# Patient Record
Sex: FEMALE | Race: BLACK OR AFRICAN AMERICAN | Employment: OTHER | URBAN - METROPOLITAN AREA
[De-identification: names, ages, dates, MRNs, and addresses within clinical notes are randomized per-mention and may not be internally consistent; named-entity substitution may affect disease eponyms.]

---

## 2017-05-15 ENCOUNTER — OFFICE VISIT (OUTPATIENT)
Dept: VASCULAR SURGERY | Age: 77
End: 2017-05-15

## 2017-05-15 DIAGNOSIS — R60.0 BILATERAL LEG EDEMA: ICD-10-CM

## 2017-05-15 DIAGNOSIS — I73.9 PAD (PERIPHERAL ARTERY DISEASE) (HCC): ICD-10-CM

## 2017-05-15 NOTE — PROCEDURES
[facility]   *** FINAL REPORT ***    Name: Garfield Boeck  MRN: [id number]       Outpatient  : 1940  HIS Order #: [id number]  [id number] [facility] Visit #: [id number]  Date: 15 May 2017    TYPE OF TEST: Peripheral Arterial Testing    REASON FOR TEST  Peripheral vascular dz NOS    Right Leg  Segmentals: Normal                     mmHg  Brachial         119  High thigh  Low thigh  Calf  Posterior tibial 130  Dorsalis pedis   118  Peroneal  Metatarsal  Toe pressure      51  Doppler:    Normal  Ankle/Brachial: 1.08    Left Leg  Segmentals: Abnormal                     mmHg  Brachial         120  High thigh  Low thigh  Calf  Posterior tibial  66  Dorsalis pedis    91  Peroneal  Metatarsal  Toe pressure      40  Doppler:    Abnormal  Ankle/Brachial: 0.76    INTERPRETATION/FINDINGS  Physiologic testing was performed using continuous wave doppler and  segmental pressures. 1. No evidence of significant peripheral arterial disease at rest in  the right leg. 2. Moderate peripheral arterial disease indicated at rest in the left  leg. 3. The right ankle/brachial index is 1.08 and the left ankle/brachial  index is 0.76.  4. The DBI on the right is 0.43 and on the left is 0.33. Essentially no significant changes as compared with previous study. ADDITIONAL COMMENTS    I have personally reviewed the data relevant to the interpretation of  this  study. TECHNOLOGIST: Adan Aguilar RVT, BS  Signed: 05/15/2017 11:27 AM    PHYSICIAN: Zina Alcocer MD  Signed: 05/15/2017 01:35 PM

## 2017-05-15 NOTE — PROCEDURES
[facility]   *** FINAL REPORT ***    Name: Kimberlee Matute  MRN: [id number]       Outpatient  : 1940  HIS Order #: [id number]  [id number] [facility] Visit #: [id number]  Date: 15 May 2017    TYPE OF TEST: Extremity Arterial Duplex    REASON FOR TEST  Peripheral vascular dz NOS                            Right                     Left  Artery               PSV   Finding             PSV   Finding  ------------------  -----  ---------------    -----  ---------------  External iliac:                                92.0  Common femoral:                               144.0  Profunda femoris:                             103.0  Proximal SFA:                            105.0  Mid SFA:                                 126.0  Distal SFA:                               81.0  Popliteal:                                     43.0  Anterior tibial:                               28.0  Posterior tibial:                              71.0    Pressures               Right     Left               -----     -----     Brachial:   119       120           DP:   118        91           PT:   130        66            ERWIN:  1.08      0.76            Toe:    51        40      INTERPRETATION/FINDINGS  Duplex images were obtained using 2-D gray scale, color flow and  spectral doppler analysis. Left leg :  1. Patent distal left external iliac artery, left common femoral  artery, proximal left profunda femoris artery and left fermoal artery  without significant stenosis. Triphasic to bipasic signals noted. 2. Patent left popliteal artery with elevated velocities noted  suggesting moderate stenosis. Biphasic to poorly biphasic signals  noted. 3. Left posterior tibial artery patent with poorly biphasic to  monophasic signals noted. Left peroneal artery and left anterior  tibial artery patent in the segments accessible to scan. Poorly  biphasic signals noted. 4. Diffuse calcific plaquing noted in the arteries assessed.   5. ABIs suggest perfusion within normal limits on the right at rest  and moderate arterial insufficiency on the left at rest.  The right  ankle/brachial index is 1.08 and the left ankle/brachial index is  0.76. ADDITIONAL COMMENTS    I have personally reviewed the data relevant to the interpretation of  this  study. TECHNOLOGIST: Emerson Aguilar RVT, BS  Signed: 05/15/2017 12:42 PM    PHYSICIAN: Jose Tao MD  Signed: 05/15/2017 01:36 PM

## 2017-05-30 ENCOUNTER — OFFICE VISIT (OUTPATIENT)
Dept: VASCULAR SURGERY | Age: 77
End: 2017-05-30

## 2017-05-30 VITALS
WEIGHT: 189 LBS | HEIGHT: 66 IN | BODY MASS INDEX: 30.37 KG/M2 | HEART RATE: 75 BPM | RESPIRATION RATE: 14 BRPM | DIASTOLIC BLOOD PRESSURE: 74 MMHG | SYSTOLIC BLOOD PRESSURE: 112 MMHG

## 2017-05-30 DIAGNOSIS — I73.9 PAD (PERIPHERAL ARTERY DISEASE) (HCC): Primary | ICD-10-CM

## 2017-05-30 DIAGNOSIS — R60.0 BILATERAL LEG EDEMA: ICD-10-CM

## 2017-05-30 NOTE — PROGRESS NOTES
Lilly Alegre    Chief Complaint   Patient presents with    Leg Pain       History and Physical    24-year-old female following up today regarding lower extremities. She has a history of mild to moderate atherosclerotic disease also swelling and some varicosities. She has had no new complaints to report she has had some local hospital visits for dehydration. She is to manage this ileostomy. She is stepped into a cactus and is occasionally having prickly parts surface but the brush away. She has no chronic ulceration. She is treated for plantar fasciitis going to physical therapy which is improving. Past Medical History:   Diagnosis Date    Diabetes ([address]     Fatigue     Rectal bleeding 9/13    TIA (transient ischemic attack)      Patient Active Problem List   Diagnosis Code    Varicose veins of lower extremities with other complications D12.212    PAD (peripheral artery disease) ([facility]) I73.9    Bilateral leg edema R60.0     Past Surgical History:   Procedure Laterality Date    HX COLECTOMY  9/2013    with ileostomy    HX COLONOSCOPY  9/2013    HX HYSTERECTOMY  1980    HX OTHER SURGICAL      left big toe ingrown nail removed, toe next to big toe on left foot is numb without circulation     Current Outpatient Prescriptions   Medication Sig Dispense Refill    cholecalciferol (VITAMIN D3) 1,000 unit cap Take  by mouth daily.  metFORMIN (GLUCOPHAGE) 500 mg tablet Take  by mouth two (2) times daily (with meals).  cholestyramine-sucrose (QUESTRAN) 4 gram powder Take  by mouth three (3) times daily (with meals).  Bifidobacterium Infantis (ALIGN) 4 mg cap Take 1 Cap by Mouth Once a Day.  mometasone (NASONEX) 50 mcg/actuation nasal spray 1 [place] by Both Nostrils route daily.  ferrous sulfate 325 mg (65 mg iron) tablet Take 1 Cap by Mouth.  aspirin delayed-release 81 mg tablet Take 1 Tab by Mouth Once a Day.       loperamide (IMODIUM A-D) 2 mg tablet Take 2 mg by Mouth Every Morning Before Breakfast.      sitaGLIPtin (JANUVIA) 100 mg tablet Take 1 Tab by Mouth Once a Day.  psyllium (METAMUCIL) powd Take  by Mouth Take As Needed. Allergies   Allergen Reactions    Apple Juice Other (comments)    Banana Unknown (comments)    Gemfibrozil Unknown (comments)    Oxycodone-Acetaminophen Other (comments)     Causes hallucinations    Shellfish Derived Unknown (comments)     denies    Statins-Hmg-Coa Reductase Inhibitors Unknown (comments)       Review of Systems    A full review of systems was completed times ten organ systems and was deemed negative unless otherwise mentioned in the HPI. Physical   Visit Vitals    /74 (BP 1 Location: Right arm, BP Patient Position: Sitting)    Pulse 75    Resp 14    Ht 5' 6\" (1.676 m)    Wt 189 lb (85.7 kg)    BMI 30.51 kg/m2       Healthy appearing 77 thin statured no distress  Head is normocephalic pupils are reactive  Neck no JVD  Chest clear  Cardiac regular  Abdomen soft nondistended ileostomy notable  Lower extremity she does have some swelling at the ankle and foot left side more than right without ulceration or skin thinning  Scattered varicosities notable  Handheld Doppler poorly biphasic pulses  Reviewed her vascular studies she has some mild to moderate SFA popliteal transition thickening and tibial disease seems to have poorly biphasic signal      Impression/Plan:     ICD-10-CM ICD-9-CM    1.  PAD (peripheral artery disease) ([facility]) I73.9 443.9 LOWER EXT ART PVR MULT LEVEL SEG PRESSURES AMB   2. Bilateral leg edema R60.0 782.3 LOWER EXT ART PVR MULT LEVEL SEG PRESSURES AMB    moderate atherosclerotic disease of the lower extremity arteries without symptoms  No claudication or ulceration  Active follow-up in 6 months for recheck  Orders Placed This Encounter    LOWER EXT ART PVR MULT LEVEL SEG PRESSURES AMB       Follow-up Disposition: Not on 2020 Judson Wilder MD    PLEASE NOTE:  This document has been produced using voice recognition software. Unrecognized errors in transcription may be present.

## 2017-11-30 ENCOUNTER — OFFICE VISIT (OUTPATIENT)
Dept: VASCULAR SURGERY | Age: 77
End: 2017-11-30

## 2017-11-30 DIAGNOSIS — R60.0 BILATERAL LEG EDEMA: ICD-10-CM

## 2017-11-30 DIAGNOSIS — I73.9 PAD (PERIPHERAL ARTERY DISEASE) (HCC): ICD-10-CM

## 2017-11-30 NOTE — PROCEDURES
[facility]   *** FINAL REPORT ***    Name: Saskia Sandoval  MRN: [id number]       Outpatient  : 1940  HIS Order #: [id number]  [id number] [facility] Visit #: [id number]  Date: 2017    TYPE OF TEST: Peripheral Arterial Testing    REASON FOR TEST  Peripheral vascular dz NOS    Right Leg  Segmentals: Normal                     mmHg  Brachial         118  High thigh  Low thigh  Calf             118  Posterior tibial 139  Dorsalis pedis   130  Peroneal  Metatarsal  Toe pressure      61  Doppler:    Normal  Ankle/Brachial: 1.18    Left Leg  Segmentals: Abnormal                     mmHg  Brachial         110  High thigh  Low thigh  Calf             123  Posterior tibial  88  Dorsalis pedis   106  Peroneal  Metatarsal  Toe pressure      43  Doppler:    Normal  Ankle/Brachial: 0.90    Site of occlusive disease:-  tibioperoneal segment    INTERPRETATION/FINDINGS  Physiologic testing was performed using continuous wave doppler and  segmental pressures. 1. No evidence of significant peripheral arterial disease at rest in  the right leg. 2. Mild to moderate peripheral arterial insufficiency  at rest in the  left leg with infrapopliteal disease. 3. The right ankle/brachial index is 1.18 and the left ankle/brachial  index is 0.90.  4. The right digit/brachial index is 0.52 and the left digit/brachial  index is 0.36.  5. Compared to the previous study on 5-15-17 there is no significant  change. ADDITIONAL COMMENTS    I have personally reviewed the data relevant to the interpretation of  this  study. TECHNOLOGIST: Neli Sibley RVT, RDMS  Signed: 2017 10:15 AM    PHYSICIAN: Blue Prabhakar.  Florian Menard MD  Signed: 2017 08:43 AM

## 2017-12-12 ENCOUNTER — OFFICE VISIT (OUTPATIENT)
Dept: VASCULAR SURGERY | Age: 77
End: 2017-12-12

## 2017-12-12 VITALS
SYSTOLIC BLOOD PRESSURE: 118 MMHG | RESPIRATION RATE: 8 BRPM | WEIGHT: 189 LBS | DIASTOLIC BLOOD PRESSURE: 70 MMHG | HEIGHT: 66 IN | BODY MASS INDEX: 30.37 KG/M2

## 2017-12-12 DIAGNOSIS — I73.9 PAD (PERIPHERAL ARTERY DISEASE) (HCC): Primary | ICD-10-CM

## 2017-12-12 NOTE — MR AVS SNAPSHOT
Visit Information Date & Time Provider Department Dept. Phone Encounter #  
 12/12/2017 10:00 AM MD [facility] [id number] Upcoming Health Maintenance Date Due DTaP/Tdap/Td series (1 - Tdap) 2/18/1961 ZOSTER VACCINE AGE 60> 12/18/1999 GLAUCOMA SCREENING Q2Y 2/18/2005 OSTEOPOROSIS SCREENING (DEXA) 2/18/2005 Pneumococcal 65+ Low/Medium Risk (1 of 2 - PCV13) 2/18/2005 MEDICARE YEARLY EXAM 2/18/2005 Influenza Age 5 to Adult 8/1/2017 Allergies as of 12/12/2017  Review Complete On: 12/12/2017 By: Joaquin Dodge MD  
  
 Severity Noted Reaction Type Reactions Apple Juice  10/30/2014    Other (comments) Banana    Unknown (comments) Gemfibrozil  06/11/2014    Unknown (comments) Oxycodone-acetaminophen  10/27/2015    Other (comments) Causes hallucinations Shellfish Derived    Unknown (comments) denies Statins-hmg-coa Reductase Inhibitors    Unknown (comments) Current Immunizations  Never Reviewed No immunizations on file. Not reviewed this visit You Were Diagnosed With   
  
 Codes Comments PAD (peripheral artery disease) (HCC)    -  Primary ICD-10-CM: I73.9 ICD-9-CM: 443. 9 Vitals BP Resp Height(growth percentile) Weight(growth percentile) BMI OB Status 118/70 (BP 1 Location: Left arm, BP Patient Position: Sitting) 8 5' 6\" (1.676 m) 189 lb (85.7 kg) 30.51 kg/m2 Hysterectomy Smoking Status Never Smoker Vitals History BMI and BSA Data Body Mass Index Body Surface Area 30.51 kg/m 2 2 m 2 Preferred Pharmacy Pharmacy Name Phone [facility] [address] [place] [address] [phone] Your Updated Medication List  
  
   
This list is accurate as of: 12/12/17 10:39 AM.  Always use your most recent med list.  
  
  
  
  
 ALIGN 4 mg Cap Generic drug:  Bifidobacterium Infantis Take 1 Cap by Mouth Once a Day. aspirin delayed-release 81 mg tablet Take 1 Tab by Mouth Once a Day. ferrous sulfate 325 mg (65 mg iron) tablet  
two (2) times a day. Take 1 Cap by Mouth. IMODIUM A-D 2 mg tablet Generic drug:  loperamide Take 2 mg by Mouth Every  Morning Before Breakfast.  
  
 metFORMIN 500 mg tablet Commonly known as:  GLUCOPHAGE Take  by mouth two (2) times daily (with meals). NASONEX 50 mcg/actuation nasal spray Generic drug:  mometasone 1 [place] by Both Nostrils route daily. QUESTRAN 4 gram powder Generic drug:  cholestyramine-sucrose Take  by mouth three (3) times daily (with meals). SITagliptin 100 mg tablet Commonly known as:  Ariana Da Silvasen Take 1 Tab by Mouth Once a Day. VITAMIN D3 1,000 unit Cap Generic drug:  cholecalciferol Take  by mouth two (2) times a day. Please provide this summary of care documentation to your next provider. Your primary care clinician is listed as Mckenna Araujo. If you have any questions after today's visit, please call [phone].

## 2018-12-21 ENCOUNTER — OFFICE VISIT (OUTPATIENT)
Dept: VASCULAR SURGERY | Age: 78
End: 2018-12-21

## 2018-12-21 VITALS
DIASTOLIC BLOOD PRESSURE: 86 MMHG | HEIGHT: 66 IN | WEIGHT: 189 LBS | RESPIRATION RATE: 17 BRPM | SYSTOLIC BLOOD PRESSURE: 130 MMHG | HEART RATE: 80 BPM | BODY MASS INDEX: 30.37 KG/M2

## 2018-12-21 DIAGNOSIS — I73.9 PAD (PERIPHERAL ARTERY DISEASE) (HCC): Primary | ICD-10-CM

## 2018-12-21 NOTE — PROGRESS NOTES
Lyssa Mays    Chief Complaint   Patient presents with    Varicose Veins       History and Physical    70-year-old here for evaluation and vascular exam.  She has a history of tibial and small vessel PAD. She is treated for diabetes. She has peripheral neuropathy related to her diabetes. She has several episodes of neuropathy pain but it resolved she treats herself with a home tea and herbal medication that gives her relief she tells me she is very happy with this. She has occasional neuropathic ulcers on the bottom of the left foot and tip of the right first toe. She is diligent with her wound care and sees Dr. Argentina Crouch for offloading. Past Medical History:   Diagnosis Date    Diabetes ([address]     Fatigue     Rectal bleeding 9/13    TIA (transient ischemic attack)      Patient Active Problem List   Diagnosis Code    Varicose veins of lower extremities with other complications T00.207    PAD (peripheral artery disease) ([facility]) I73.9    Bilateral leg edema R60.0     Past Surgical History:   Procedure Laterality Date    HX COLECTOMY  9/2013    with ileostomy    HX COLONOSCOPY  9/2013    HX HYSTERECTOMY  1980    HX OTHER SURGICAL      left big toe ingrown nail removed, toe next to big toe on left foot is numb without circulation     Current Outpatient Medications   Medication Sig Dispense Refill    cholecalciferol (VITAMIN D3) 1,000 unit cap Take  by mouth two (2) times a day.  metFORMIN (GLUCOPHAGE) 500 mg tablet Take  by mouth two (2) times daily (with meals).  cholestyramine-sucrose (QUESTRAN) 4 gram powder Take  by mouth three (3) times daily (with meals).  Bifidobacterium Infantis (ALIGN) 4 mg cap Take 1 Cap by Mouth Once a Day.  mometasone (NASONEX) 50 mcg/actuation nasal spray 1 [place] by Both Nostrils route daily.  ferrous sulfate 325 mg (65 mg iron) tablet two (2) times a day. Take 1 Cap by Mouth.       aspirin delayed-release 81 mg tablet Take 1 Tab by Mouth Once a Day.  loperamide (IMODIUM A-D) 2 mg tablet Take 2 mg by Mouth Every  Morning Before Breakfast.      sitaGLIPtin (JANUVIA) 100 mg tablet Take 1 Tab by Mouth Once a Day. Allergies   Allergen Reactions    Apple Juice Other (comments)    Banana Unknown (comments)    Gemfibrozil Unknown (comments)    Oxycodone-Acetaminophen Other (comments)     Causes hallucinations    Shellfish Derived Unknown (comments)     denies    Statins-Hmg-Coa Reductase Inhibitors Unknown (comments)       Review of Systems    A full review of systems was completed times ten organ systems and was deemed negative unless otherwise mentioned in the HPI. Physical   Visit Vitals  /86 (BP 1 Location: Left arm, BP Patient Position: Sitting)   Pulse 80   Resp 17   Ht 5' 6\" (1.676 m)   Wt 189 lb (85.7 kg)   BMI 30.51 kg/m²       Youthful appearing 78 in good spirits  Head is normocephalic no facial symmetry  Neck no JVD I do not hear a carotid bruit  Chest clear  Cardiac regular  Abdomen soft flat nontender  Extremities range of motion strength are equal there is no deficit  Mild bilateral ankle and foot edema  Doppler pulses present bilateral  Left heel callus noted does not appear open  Right big toe examined no signs of infection  No studies for this visit      Impression/Plan:     ICD-10-CM ICD-9-CM    1. PAD (peripheral artery disease) ([facility]) I73.9 443.9 LOWER EXT ART PVR MULT LEVEL SEG PRESSURES     Stable peripheral artery disease will reexamine in 1 year  Continue with her offloading and general wellness  We will check a PVL in 1 year  No orders of the defined types were placed in this encounter. Follow-up Disposition:  Return in about 1 year (around 12/21/2019). Farzana Em MD    PLEASE NOTE:  This document has been produced using voice recognition software. Unrecognized errors in transcription may be present.

## 2018-12-21 NOTE — PROGRESS NOTES
1. Have you been to an emergency room or urgent care clinic since your last visit? 2 weeks ago  Hospitalized since your last visit? If yes, where, when, and reason for visit? No  2. Have you seen or consulted any other health care providers outside of the [facility] since your last visit including any procedures, health maintenance items.  If yes, where, when and reason for visit? nO

## 2019-02-22 ENCOUNTER — OFFICE VISIT (OUTPATIENT)
Dept: VASCULAR SURGERY | Age: 79
End: 2019-02-22

## 2019-02-22 VITALS
WEIGHT: 189 LBS | RESPIRATION RATE: 17 BRPM | HEIGHT: 66 IN | BODY MASS INDEX: 30.37 KG/M2 | SYSTOLIC BLOOD PRESSURE: 120 MMHG | DIASTOLIC BLOOD PRESSURE: 64 MMHG | HEART RATE: 70 BPM

## 2019-02-22 DIAGNOSIS — S97.82XA CRUSHING INJURY OF LEFT FOOT, INITIAL ENCOUNTER: Primary | ICD-10-CM

## 2019-02-22 DIAGNOSIS — T14.8XXA HEMATOMA AND CONTUSION: ICD-10-CM

## 2019-02-22 DIAGNOSIS — I73.9 PAD (PERIPHERAL ARTERY DISEASE) (HCC): ICD-10-CM

## 2019-02-22 NOTE — PROGRESS NOTES
Ms Baylee Leija is here for an unscheduled visit  She is referred  By podiatry    She has known tibial disease of left leg, with intervention in 2014  She was seen in December by Dr Svetlana Alegre, and with no problems was recommended for a one year follow up    However, about 3 weeks ago, she dropped items on her left foot - twice in one day  She then had significant swelling and discoloration of the foot, as well as pain  She was also retaining fluid, and it sounds like she was given short course of some diuretics which helped  Podiatry did r/o fracture    But with amount of pain, and known PAD, studies and follow up were suggested    Overall, she does state some of the foot swelling and bruising are improved. But there is still some edema and discoloration on the top of the left foot which is consistent with a healing hematoma. There is no open wound. No erythema or warmth. I advised continue with efforts of some elevation, and warm compresses. We also applied and instructed use of light compression sleeve with a tubi     I have it noted to call her in about 2 weeks to inquire how she is doing so we can advise further follow up    Of note, I did review her studies. Her last studies had been in 2017 and she had a 0.90 loni. Recent studies this week show loni of 0.70. Still with tibial disease which has historically been level of disease treated before. She asked if her injury worsened these studies, and I stated that is an unlikely correlation. The measurements are obtained from vessels above the level of the injury. But knowing her perfusion, if this doesn't have continued improvement in pain, we can associate that the injury exacerbated some ischemic symptoms, and we may have to entertain discussion of an intervention. But we both agreed, neither of us are quick to jump on a decision for a procedure!  So I reinforced use of the conservative measures above, and I will call her in a couple of weeks to check on her progress    Total time spent with patient today was 15 minutes

## 2019-02-22 NOTE — PROGRESS NOTES
1. Have you been to an emergency room or urgent care clinic since your last visit? 2/20/2019    Hospitalized since your last visit? If yes, where, when, and reason for visit? nO  2. Have you seen or consulted any other health care providers outside of the [facility] since your last visit including any procedures, health maintenance items. If yes, where, when and reason for visit?  NO

## 2019-03-06 ENCOUNTER — DOCUMENTATION ONLY (OUTPATIENT)
Dept: VASCULAR SURGERY | Age: 79
End: 2019-03-06

## 2019-03-06 NOTE — PROGRESS NOTES
Per discussion in office, I was to call patient at this time frame to inquire about how her foot was feeling so we could determine next follow up    No answer, so I had to leave voice mail requesting a return call

## 2019-03-18 ENCOUNTER — DOCUMENTATION ONLY (OUTPATIENT)
Dept: VASCULAR SURGERY | Age: 79
End: 2019-03-18

## 2019-03-18 NOTE — PROGRESS NOTES
Patient had come in February and I had not noted to follow-up with her by phone to see how her feet were doing and if she would need sooner follow-up. I left a voicemail on March 6 to check on her and had not heard back.   I called again today March 18 and no answer so I left another voicemail requesting a return call

## 2019-03-29 ENCOUNTER — DOCUMENTATION ONLY (OUTPATIENT)
Dept: VASCULAR SURGERY | Age: 79
End: 2019-03-29

## 2019-03-29 NOTE — PROGRESS NOTES
We had incorrect phone number for patient and I did receive a new number for her and finally spoke with her today    Though her foot is in fact better, she accused me of making her feel like she was a bother when she had come in to see us    To review the nature of the visit, she had dropped items onto her left foot, twice, prior to the visit. Fracture had been ruled out. She had no open wound. It was felt to be a hematoma from the trauma. She has known PAD which has been followed closely. I had suggested to do elevation/warm compresses, and compression sleeves. We in fact gave her a compression sleeve that day with verbal instructions. I had stated to her I would call back to check on her, which I did initially without success until today    As she went on accusing me of making her feel like a bother, I did tell her we do specialize in vascular, and that she had experienced trauma, with no open wound, etc, and that I had questioned if we were the provider she even needed to see. That is why I had kindly offered to call back and at least check on her progress to make sure no worsening of her symptoms, being that she has known PAD. She states that she has had some follow up elsewhere and did receive reassurance. I did not inquire as to who or what specialty.     I did apologize for her experience   I still stood firm on trying to have her understand the nature of our specialty but I feel certain I did not effectively do so as she continued to complain

## 2019-03-29 NOTE — PROGRESS NOTES
HISTORY OF PRESENT ILLNESS  Zaheer Arboleda is a 78 y.o. female.   HPI    ROS    Physical Exam    ASSESSMENT and PLAN

## 2020-01-10 ENCOUNTER — OFFICE VISIT (OUTPATIENT)
Dept: VASCULAR SURGERY | Age: 80
End: 2020-01-10

## 2020-01-10 VITALS
WEIGHT: 189 LBS | HEART RATE: 94 BPM | RESPIRATION RATE: 17 BRPM | OXYGEN SATURATION: 99 % | HEIGHT: 66 IN | BODY MASS INDEX: 30.37 KG/M2 | DIASTOLIC BLOOD PRESSURE: 70 MMHG | SYSTOLIC BLOOD PRESSURE: 126 MMHG

## 2020-01-10 DIAGNOSIS — I73.9 PVD (PERIPHERAL VASCULAR DISEASE) (HCC): Primary | ICD-10-CM

## 2020-01-10 DIAGNOSIS — R60.0 BILATERAL LOWER EXTREMITY EDEMA: ICD-10-CM

## 2020-01-10 NOTE — PROGRESS NOTES
1. Have you been to an emergency room or urgent care clinic since your last visit? no    Hospitalized since your last visit? If yes, where, when, and reason for visit? no  2. Have you seen or consulted any other health care providers outside of the [facility] since your last visit including any procedures, health maintenance items.  If yes, where, when and reason for visit? no

## 2020-01-10 NOTE — PROGRESS NOTES
Nam Guerrero    Chief Complaint   Patient presents with    Varicose Veins       History and Physical    80-year-old female following up today regarding her history of peripheral artery disease. She had a injury to her right foot but is completely healed up. Her concern today is bilateral lower extremity swelling. She had doubled her diuretic but experienced a nosebleed and felt these might be related so he backed so she went back to her regular dose of diuretic. The diuretic did help her edema although. She says when she lays down and puts her feet up it goes away. No skin ulcerations no new changes no pain no discolorations or wounds. She treats her diabetes and hyper lipidemia. Past Medical History:   Diagnosis Date    Diabetes ([facility]     Fatigue     Rectal bleeding 9/13    TIA (transient ischemic attack)      Patient Active Problem List   Diagnosis Code    Varicose veins of lower extremities with other complications Z19.883    PAD [facility]peripheral artery disease) ([facility]) I73.9    Bilateral leg edema R60.0     Past Surgical History:   Procedure Laterality Date    HX COLECTOMY  9/2013    with ileostomy    HX COLONOSCOPY  9/2013    HX HYSTERECTOMY  1980    HX OTHER SURGICAL      left big toe ingrown nail removed, toe next to big toe on left foot is numb without circulation     Current Outpatient Medications   Medication Sig Dispense Refill    cholecalciferol (VITAMIN D3) 1,000 unit cap Take  by mouth two (2) times a day.  metFORMIN (GLUCOPHAGE) 500 mg tablet Take  by mouth two (2) times daily (with meals).  cholestyramine-sucrose (QUESTRAN) 4 gram powder Take  by mouth three (3) times daily (with meals).  Bifidobacterium Infantis (ALIGN) 4 mg cap Take 1 Cap by Mouth Once a Day.  mometasone (NASONEX) 50 mcg/actuation nasal spray 1 [place] by Both Nostrils route daily.  ferrous sulfate 325 mg (65 mg iron) tablet two (2) times a day. Take 1 Cap by Mouth.       aspirin delayed-release 81 mg tablet Take 1 Tab by Mouth Once a Day.  loperamide (IMODIUM A-D) 2 mg tablet Take 2 mg by Mouth Every  Morning Before Breakfast.      sitaGLIPtin (JANUVIA) 100 mg tablet Take 1 Tab by Mouth Once a Day. Allergies   Allergen Reactions    Apple Juice Other (comments)    Banana Unknown (comments)    Gemfibrozil Unknown (comments)    Oxycodone-Acetaminophen Other (comments)     Causes hallucinations    Shellfish Derived Unknown (comments)     denies    Statins-Hmg-Coa Reductase Inhibitors Unknown (comments)       Review of Systems    A full review of systems was completed times ten organ systems and was deemed negative unless otherwise mentioned in the HPI. Physical   Visit Vitals  /70 (BP 1 Location: Left arm, BP Patient Position: Sitting)   Pulse 94   Resp 17   Ht 5' 6\" (1.676 m)   Wt 189 lb (85.7 kg)   SpO2 99%   BMI 30.51 kg/m²       She looks well today she is a super youthful 79 in good spirits  Head is normocephalic  Neck no JVD  Chest is clear  Cardiac regular  Abdomen soft flat nontender  Lower extremities she has equal and swelling at both ankles mild to moderate in nature only. There is no ulcerations no signs of ischemia I reviewed her arterial vascular testing she has a normal duplex on the right. 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 Impression/Plan:     ICD-10-CM ICD-9-CM    1. PVD (peripheral vascular disease) ([facility]) I73.9 443.9    2. Bilateral lower extremity edema R60.0 782.3 DUPLEX LOWER EXT VENOUS BILAT     Right now she has tibial occlusive disease left leg is asymptomatic. She is concerned about her swelling and a blood clots on a set her up for a DVT study. She will continue with elevation and management of fluid retention is tolerated. No orders of the defined types were placed in this encounter. Ismael Osullivan MD    PLEASE NOTE:  This document has been produced using voice recognition software.  Unrecognized errors in transcription may be present.

## 2020-02-18 ENCOUNTER — OFFICE VISIT (OUTPATIENT)
Dept: VASCULAR SURGERY | Age: 80
End: 2020-02-18

## 2020-02-18 VITALS
HEIGHT: 66 IN | DIASTOLIC BLOOD PRESSURE: 70 MMHG | SYSTOLIC BLOOD PRESSURE: 122 MMHG | WEIGHT: 189 LBS | BODY MASS INDEX: 30.37 KG/M2 | RESPIRATION RATE: 17 BRPM

## 2020-02-18 DIAGNOSIS — R60.0 BILATERAL LOWER EXTREMITY EDEMA: Primary | ICD-10-CM

## 2020-02-18 NOTE — PROGRESS NOTES
1. Have you been to an emergency room or urgent care clinic since your last visit? NO    Hospitalized since your last visit? If yes, where, when, and reason for visit? NO  2. Have you seen or consulted any other health care providers outside of the [facility] since your last visit including any procedures, health maintenance items. If yes, where, when and reason for visit?  NO

## 2020-10-26 DIAGNOSIS — I73.9 PAD (PERIPHERAL ARTERY DISEASE) (HCC): ICD-10-CM

## 2020-10-26 DIAGNOSIS — I73.9 PAD (PERIPHERAL ARTERY DISEASE) (HCC): Primary | ICD-10-CM

## 2020-10-26 NOTE — PROGRESS NOTES
Order placed for leg arterial per verbal order from Dr. Inez Dias after discussing pt. Daughter called and states that pt was seen by endocrinology last week and then by podiatry last week and they wanted the pt to follow up with vascular due to diminished pulses, daughter states that she normally has vascular study and thought that was what the appt was for. Advised pt was to have follow up no study on Thursday. Discussed with provider and above orders placed.

## 2020-10-29 LAB
LEFT ABI: 0.77
LEFT ANTERIOR TIBIAL: 89 MMHG
LEFT ARM BP: 111 MMHG
LEFT CALF PRESSURE: 101 MMHG
LEFT POSTERIOR TIBIAL: 62 MMHG
LEFT TBI: 0.3
LEFT TOE PRESSURE: 35 MMHG
RIGHT ABI: 1.17
RIGHT ANTERIOR TIBIAL: 123 MMHG
RIGHT ARM BP: 115 MMHG
RIGHT CALF PRESSURE: 118 MMHG
RIGHT POSTERIOR TIBIAL: 135 MMHG
RIGHT TBI: 0.49
RIGHT TOE PRESSURE: 56 MMHG

## 2020-10-30 ENCOUNTER — OFFICE VISIT (OUTPATIENT)
Dept: VASCULAR SURGERY | Age: 80
End: 2020-10-30
Payer: MEDICARE

## 2020-10-30 VITALS
RESPIRATION RATE: 20 BRPM | HEART RATE: 93 BPM | OXYGEN SATURATION: 100 % | DIASTOLIC BLOOD PRESSURE: 78 MMHG | SYSTOLIC BLOOD PRESSURE: 150 MMHG

## 2020-10-30 DIAGNOSIS — L97.521 PLANTAR ULCER OF LEFT FOOT, LIMITED TO BREAKDOWN OF SKIN (HCC): ICD-10-CM

## 2020-10-30 DIAGNOSIS — I73.9 PAD (PERIPHERAL ARTERY DISEASE) (HCC): Primary | ICD-10-CM

## 2020-10-30 PROCEDURE — 1101F PT FALLS ASSESS-DOCD LE1/YR: CPT | Performed by: SURGERY

## 2020-10-30 PROCEDURE — 99213 OFFICE O/P EST LOW 20 MIN: CPT | Performed by: SURGERY

## 2020-10-30 PROCEDURE — G8417 CALC BMI ABV UP PARAM F/U: HCPCS | Performed by: SURGERY

## 2020-10-30 PROCEDURE — G8400 PT W/DXA NO RESULTS DOC: HCPCS | Performed by: SURGERY

## 2020-10-30 PROCEDURE — G8510 SCR DEP NEG, NO PLAN REQD: HCPCS | Performed by: SURGERY

## 2020-10-30 PROCEDURE — G8427 DOCREV CUR MEDS BY ELIG CLIN: HCPCS | Performed by: SURGERY

## 2020-10-30 PROCEDURE — 1090F PRES/ABSN URINE INCON ASSESS: CPT | Performed by: SURGERY

## 2020-10-30 PROCEDURE — G8536 NO DOC ELDER MAL SCRN: HCPCS | Performed by: SURGERY

## 2020-10-30 RX ORDER — HYDROCHLOROTHIAZIDE 12.5 MG/1
12.5 TABLET ORAL DAILY
COMMUNITY

## 2020-10-30 RX ORDER — MULTIVIT WITH MINERALS/HERBS
1 TABLET ORAL DAILY
COMMUNITY

## 2020-10-30 NOTE — PROGRESS NOTES
Shikha Martini    Chief Complaint   Patient presents with    Leg Pain    Leg Swelling       History and Physical    80-year-old female here today with a nonhealing chronic ulcer on the bottom of her left foot. She is had multiple debridements and good offloading. She sees Dr. Pamela Robins for her foot care. She has a new study that shows similar findings see below. Now this ulcer is painful has been present for several months. She has history of diabetes and PAD historically    Past Medical History:   Diagnosis Date    Diabetes ([address]     Fatigue     Rectal bleeding 9/13    TIA (transient ischemic attack)      Patient Active Problem List   Diagnosis Code    Varicose veins of lower extremities with other complications P71.180    PAD (peripheral artery disease) ([facility]) I73.9    Bilateral leg edema R60.0     Past Surgical History:   Procedure Laterality Date    HX COLECTOMY  9/2013    with ileostomy    HX COLONOSCOPY  9/2013    HX HYSTERECTOMY  1980    HX OTHER SURGICAL      left big toe ingrown nail removed, toe next to big toe on left foot is numb without circulation     Current Outpatient Medications   Medication Sig Dispense Refill    b complex vitamins tablet Take 1 Tab by mouth daily.  hydroCHLOROthiazide (HYDRODIURIL) 12.5 mg tablet Take 12.5 mg by mouth daily.  cholecalciferol (VITAMIN D3) 1,000 unit cap Take  by mouth two (2) times a day.  metFORMIN (GLUCOPHAGE) 500 mg tablet Take  by mouth two (2) times daily (with meals).  cholestyramine-sucrose (QUESTRAN) 4 gram powder Take  by mouth three (3) times daily (with meals).  Bifidobacterium Infantis (ALIGN) 4 mg cap Take 1 Cap by Mouth Once a Day.  mometasone (NASONEX) 50 mcg/actuation nasal spray 1 [place] by Both Nostrils route daily.  ferrous sulfate 325 mg (65 mg iron) tablet two (2) times a day. Take 1 Cap by Mouth.  aspirin delayed-release 81 mg tablet Take 1 Tab by Mouth Once a Day.       loperamide (IMODIUM A-D) 2 mg tablet Take 2 mg by Mouth Every  Morning Before Breakfast.      sitaGLIPtin (JANUVIA) 100 mg tablet Take 1 Tab by Mouth Once a Day. Allergies   Allergen Reactions    Apple Juice Other (comments)    Banana Unknown (comments)    Gemfibrozil Unknown (comments)    Oxycodone-Acetaminophen Other (comments)     Causes hallucinations    Shellfish Derived Unknown (comments)     denies    Statins-Hmg-Coa Reductase Inhibitors Unknown (comments)       Review of Systems    A full review of systems was completed times ten organ systems and was deemed negative unless otherwise mentioned in the HPI. Physical   Visit Vitals  BP (!) 150/78 (BP 1 Location: Right arm, BP Patient Position: Sitting)   Pulse 93   Resp 20   SpO2 100%       No acute distress  Head is normocephalic  Neck no JVD  Chest clear breath sounds bilateral  Cardiac regular  Abdomen nondistended soft  Right lower extremity well perfused no signs of arterial insufficiency  Left lower extremity nonpalpable pulses in this ulcer on the bottom of the left foot limited to breakdown the skin. Her vascular studies show infrapopliteal PAD significant in nature on the left side. On the right there are no signs of arterial sufficiency    Impression/Plan:     ICD-10-CM ICD-9-CM    1. PAD (peripheral artery disease) ([facility])  I73.9 443.9    2. Plantar ulcer of left foot, [facility] to breakdown [facility]  L97.521 707.14      With her having a painful nonhealing ulcer and Doppler signs of PAD and recommending an angiogram.  We will proceed with an angiogram she has any opportunity to improve her circulation to that foot. She will continue with her current offloading and excellent foot care. Alyx Ziegler MD    PLEASE NOTE:  This document has been produced using voice recognition software. Unrecognized errors in transcription may be present.

## 2020-10-30 NOTE — PROGRESS NOTES
1. Have you been to the ER, urgent care clinic since your last visit? Hospitalized since your last visit? No    2. Have you seen or consulted any other health care providers outside of the [address] [place] [address] since your last visit? Include any pap smears or colon screening.  Yes Reason for visit: pcp , podiatry , endocrinology         3 most recent [facility] 36 Screens 10/30/2020   Little interest or pleasure in doing things Not at all   Feeling down, depressed, irritable, or hopeless Several days   Total Score PHQ 2 1

## 2020-11-02 RX ORDER — DIPHENHYDRAMINE HCL 25 MG
CAPSULE ORAL
Qty: 4 CAP | Refills: 0 | Status: SHIPPED | OUTPATIENT
Start: 2020-11-02

## 2020-11-02 RX ORDER — PANTOPRAZOLE SODIUM 40 MG/1
TABLET, DELAYED RELEASE ORAL
Qty: 2 TAB | Refills: 0 | Status: SHIPPED | OUTPATIENT
Start: 2020-11-02

## 2020-11-02 RX ORDER — PREDNISONE 50 MG/1
TABLET ORAL
Qty: 3 TAB | Refills: 0 | Status: SHIPPED | OUTPATIENT
Start: 2020-11-02

## 2020-11-09 ENCOUNTER — HOSPITAL ENCOUNTER (OUTPATIENT)
Dept: LAB | Age: 80
Discharge: HOME | End: 2020-11-09
Payer: MEDICARE

## 2020-11-09 ENCOUNTER — HOSPITAL ENCOUNTER (OUTPATIENT)
Dept: GENERAL RADIOLOGY | Age: 80
Discharge: HOME | End: 2020-11-09
Payer: MEDICARE

## 2020-11-09 DIAGNOSIS — L97.521 PLANTAR ULCER OF LEFT FOOT, LIMITED TO BREAKDOWN OF SKIN (HCC): ICD-10-CM

## 2020-11-09 DIAGNOSIS — I73.9 PAD (PERIPHERAL ARTERY DISEASE) (HCC): ICD-10-CM

## 2020-11-09 LAB
ANION GAP SERPL CALC-SCNC: 4 MMOL/L (ref 3–18)
ATRIAL RATE: 75 BPM
BUN SERPL-MCNC: 20 MG/DL (ref 7–18)
BUN/CREAT SERPL: 18 (ref 12–20)
CALCIUM SERPL-MCNC: 9.9 MG/DL (ref 8.5–10.1)
CALCULATED P AXIS, ECG09: 5 DEGREES
CALCULATED R AXIS, ECG10: 17 DEGREES
CALCULATED T AXIS, ECG11: 47 DEGREES
CHLORIDE SERPL-SCNC: 106 MMOL/L (ref 100–111)
CO2 SERPL-SCNC: 26 MMOL/L (ref 21–32)
CREAT SERPL-MCNC: 1.09 MG/DL (ref 0.6–1.3)
DIAGNOSIS, 93000: NORMAL
ERYTHROCYTE [DISTWIDTH] IN BLOOD BY AUTOMATED COUNT: 14.6 % (ref 11.6–14.5)
GLUCOSE SERPL-MCNC: 141 MG/DL (ref 74–99)
HCT VFR BLD AUTO: 29.2 % (ref 35–45)
HGB BLD-MCNC: 9 G/DL (ref 12–16)
INR PPP: 1.1 (ref 0.8–1.2)
MCH RBC QN AUTO: 26.1 PG (ref 24–34)
MCHC RBC AUTO-ENTMCNC: 30.8 G/DL (ref 31–37)
MCV RBC AUTO: 84.6 FL (ref 74–97)
P-R INTERVAL, ECG05: 188 MS
PLATELET # BLD AUTO: 217 K/UL (ref 135–420)
PMV BLD AUTO: 13.4 FL (ref 9.2–11.8)
POTASSIUM SERPL-SCNC: 4.3 MMOL/L (ref 3.5–5.5)
PROTHROMBIN TIME: 14.4 SEC (ref 11.5–15.2)
Q-T INTERVAL, ECG07: 368 MS
QRS DURATION, ECG06: 70 MS
QTC CALCULATION (BEZET), ECG08: 410 MS
RBC # BLD AUTO: 3.45 M/UL (ref 4.2–5.3)
SODIUM SERPL-SCNC: 136 MMOL/L (ref 136–145)
VENTRICULAR RATE, ECG03: 75 BPM
WBC # BLD AUTO: 9.1 K/UL (ref 4.6–13.2)

## 2020-11-09 PROCEDURE — 80048 BASIC METABOLIC PNL TOTAL CA: CPT

## 2020-11-09 PROCEDURE — 71046 X-RAY EXAM CHEST 2 VIEWS: CPT

## 2020-11-09 PROCEDURE — 85027 COMPLETE CBC AUTOMATED: CPT

## 2020-11-09 PROCEDURE — 85610 PROTHROMBIN TIME: CPT

## 2020-11-09 PROCEDURE — 93005 ELECTROCARDIOGRAM TRACING: CPT

## 2020-11-09 PROCEDURE — 36415 COLL VENOUS BLD VENIPUNCTURE: CPT

## 2020-11-20 NOTE — H&P
History and Physical    77-year-old female here today with a nonhealing chronic ulcer on the bottom of her left foot. She is had multiple debridements and good offloading. She sees Dr. Pamela Robins for her foot care. She has a new study that shows similar findings see below. Now this ulcer is painful has been present for several months. She has history of diabetes and PAD historically          Past Medical History:   Diagnosis Date    Diabetes ([address]      Fatigue      Rectal bleeding 9/13    TIA (transient ischemic attack)             Patient Active Problem List   Diagnosis Code    Varicose veins of lower extremities with other complications G80.086    PAD (peripheral artery disease) ([facility] I73.9    Bilateral leg edema R60.0            Past Surgical History:   Procedure Laterality Date    HX COLECTOMY   9/2013     with ileostomy    HX COLONOSCOPY   9/2013    HX HYSTERECTOMY   1980    HX OTHER SURGICAL         left big toe ingrown nail removed, toe next to big toe on left foot is numb without circulation             Current Outpatient Medications   Medication Sig Dispense Refill    b complex vitamins tablet Take 1 Tab by mouth daily.        hydroCHLOROthiazide (HYDRODIURIL) 12.5 mg tablet Take 12.5 mg by mouth daily.        cholecalciferol (VITAMIN D3) 1,000 unit cap Take  by mouth two (2) times a day.        metFORMIN (GLUCOPHAGE) 500 mg tablet Take  by mouth two (2) times daily (with meals).        cholestyramine-sucrose (QUESTRAN) 4 gram powder Take  by mouth three (3) times daily (with meals).        Bifidobacterium Infantis (ALIGN) 4 mg cap Take 1 Cap by Mouth Once a Day.        mometasone (NASONEX) 50 mcg/actuation nasal spray 1 [place] by Both Nostrils route daily.        ferrous sulfate 325 mg (65 mg iron) tablet two (2) times a day. Take 1 Cap by Mouth.        aspirin delayed-release 81 mg tablet Take 1 Tab by Mouth Once a Day.         loperamide (IMODIUM A-D) 2 mg tablet Take 2 mg by Mouth Every  Morning Before Breakfast.        sitaGLIPtin (JANUVIA) 100 mg tablet Take 1 Tab by Mouth Once a Day.                Allergies   Allergen Reactions    Apple Juice Other (comments)    Banana Unknown (comments)    Gemfibrozil Unknown (comments)    Oxycodone-Acetaminophen Other (comments)       Causes hallucinations    Shellfish Derived Unknown (comments)       denies    Statins-Hmg-Coa Reductase Inhibitors Unknown (comments)         Review of Systems    A full review of systems was completed times ten organ systems and was deemed negative unless otherwise mentioned in the HPI.     Physical   Visit Vitals  BP (!) 150/78 (BP 1 Location: Right arm, BP Patient Position: Sitting)   Pulse 93   Resp 20   SpO2 100%         No acute distress  Head is normocephalic  Neck no JVD  Chest clear breath sounds bilateral  Cardiac regular  Abdomen nondistended soft  Right lower extremity well perfused no signs of arterial insufficiency  Left lower extremity nonpalpable pulses in this ulcer on the bottom of the left foot limited to breakdown the skin. Her vascular studies show infrapopliteal PAD significant in nature on the left side. On the right there are no signs of arterial sufficiency     Impression/Plan:       ICD-10-CM ICD-9-CM     1. PAD (peripheral artery disease) ([facility])  I73.9 443. 9     2. Plantar ulcer of left foot, limited to breakdown of skin ([facility]  L97.521 707.14        With her having a painful nonhealing ulcer and Doppler signs of PAD and recommending an angiogram.  We will proceed with an angiogram she has any opportunity to improve her circulation to that foot.   She will continue with her current offloading and excellent foot care.

## 2020-11-25 ENCOUNTER — ANESTHESIA EVENT (OUTPATIENT)
Dept: CARDIAC CATH/INVASIVE PROCEDURES | Age: 80
End: 2020-11-25
Payer: MEDICARE

## 2020-11-25 ENCOUNTER — HOSPITAL ENCOUNTER (OUTPATIENT)
Age: 80
Discharge: HOME | End: 2020-11-25
Attending: SURGERY | Admitting: SURGERY
Payer: MEDICARE

## 2020-11-25 ENCOUNTER — ANESTHESIA (OUTPATIENT)
Dept: CARDIAC CATH/INVASIVE PROCEDURES | Age: 80
End: 2020-11-25
Payer: MEDICARE

## 2020-11-25 VITALS
WEIGHT: 173 LBS | SYSTOLIC BLOOD PRESSURE: 142 MMHG | DIASTOLIC BLOOD PRESSURE: 69 MMHG | OXYGEN SATURATION: 97 % | HEIGHT: 66 IN | RESPIRATION RATE: 30 BRPM | HEART RATE: 78 BPM | TEMPERATURE: 97.5 F | BODY MASS INDEX: 27.8 KG/M2

## 2020-11-25 DIAGNOSIS — I73.9 PAD (PERIPHERAL ARTERY DISEASE) (HCC): ICD-10-CM

## 2020-11-25 DIAGNOSIS — L97.521 ULCER OF FOOT, LEFT, LIMITED TO BREAKDOWN OF SKIN (HCC): ICD-10-CM

## 2020-11-25 LAB
ANION GAP SERPL CALC-SCNC: 5 MMOL/L (ref 3–18)
BUN SERPL-MCNC: 19 MG/DL (ref 7–18)
BUN/CREAT SERPL: 16 (ref 12–20)
CALCIUM SERPL-MCNC: 9.6 MG/DL (ref 8.5–10.1)
CHLORIDE SERPL-SCNC: 108 MMOL/L (ref 100–111)
CO2 SERPL-SCNC: 26 MMOL/L (ref 21–32)
CREAT SERPL-MCNC: 1.19 MG/DL (ref 0.6–1.3)
ERYTHROCYTE [DISTWIDTH] IN BLOOD BY AUTOMATED COUNT: 14.3 % (ref 11.6–14.5)
GLUCOSE SERPL-MCNC: 228 MG/DL (ref 74–99)
HCT VFR BLD AUTO: 28.4 % (ref 35–45)
HGB BLD-MCNC: 8.9 G/DL (ref 12–16)
INR PPP: 1.1 (ref 0.8–1.2)
MCH RBC QN AUTO: 26.3 PG (ref 24–34)
MCHC RBC AUTO-ENTMCNC: 31.3 G/DL (ref 31–37)
MCV RBC AUTO: 83.8 FL (ref 74–97)
PLATELET # BLD AUTO: 255 K/UL (ref 135–420)
PMV BLD AUTO: 9.8 FL (ref 9.2–11.8)
POTASSIUM SERPL-SCNC: 4.3 MMOL/L (ref 3.5–5.5)
PROTHROMBIN TIME: 14.5 SEC (ref 11.5–15.2)
RBC # BLD AUTO: 3.39 M/UL (ref 4.2–5.3)
SODIUM SERPL-SCNC: 139 MMOL/L (ref 136–145)
WBC # BLD AUTO: 8.8 K/UL (ref 4.6–13.2)

## 2020-11-25 PROCEDURE — C1769 GUIDE WIRE: HCPCS | Performed by: SURGERY

## 2020-11-25 PROCEDURE — 74011000636 HC RX REV CODE- 636: Performed by: SURGERY

## 2020-11-25 PROCEDURE — C1724 CATH, TRANS ATHEREC,ROTATION: HCPCS | Performed by: SURGERY

## 2020-11-25 PROCEDURE — 75625 CONTRAST EXAM ABDOMINL AORTA: CPT | Performed by: SURGERY

## 2020-11-25 PROCEDURE — 74011000250 HC RX REV CODE- 250: Performed by: SURGERY

## 2020-11-25 PROCEDURE — C1894 INTRO/SHEATH, NON-LASER: HCPCS | Performed by: SURGERY

## 2020-11-25 PROCEDURE — C1760 CLOSURE DEV, VASC: HCPCS | Performed by: SURGERY

## 2020-11-25 PROCEDURE — 77030008584 HC TOOL GDWRE DEV TERU -A: Performed by: SURGERY

## 2020-11-25 PROCEDURE — 76937 US GUIDE VASCULAR ACCESS: CPT | Performed by: SURGERY

## 2020-11-25 PROCEDURE — 37229 HC ARTHERC TIB/PER UNI +/-PTA INIT: CPT | Performed by: SURGERY

## 2020-11-25 PROCEDURE — 01924 ANES THER IVNTL RAD ARTL NOS: CPT | Performed by: ANESTHESIOLOGY

## 2020-11-25 PROCEDURE — 85610 PROTHROMBIN TIME: CPT

## 2020-11-25 PROCEDURE — 77030013519 HC DEV INFL BASIX MRTM -B: Performed by: SURGERY

## 2020-11-25 PROCEDURE — C1725 CATH, TRANSLUMIN NON-LASER: HCPCS | Performed by: SURGERY

## 2020-11-25 PROCEDURE — 75710 ARTERY X-RAYS ARM/LEG: CPT | Performed by: SURGERY

## 2020-11-25 PROCEDURE — 76060000033 HC ANESTHESIA 1 TO 1.5 HR: Performed by: SURGERY

## 2020-11-25 PROCEDURE — 74011000250 HC RX REV CODE- 250: Performed by: ANESTHESIOLOGY

## 2020-11-25 PROCEDURE — 77030004530 HC CATH ANGI DX IMGR BSC -A: Performed by: SURGERY

## 2020-11-25 PROCEDURE — C1887 CATHETER, GUIDING: HCPCS | Performed by: SURGERY

## 2020-11-25 PROCEDURE — 77030013744: Performed by: SURGERY

## 2020-11-25 PROCEDURE — 99100 ANES PT EXTEME AGE<1 YR&>70: CPT | Performed by: ANESTHESIOLOGY

## 2020-11-25 PROCEDURE — 37229 PR REVSC OPN/PRQ TIB/PERO W/ATHRC/ANGIOP SM VSL: CPT | Performed by: SURGERY

## 2020-11-25 PROCEDURE — 80048 BASIC METABOLIC PNL TOTAL CA: CPT

## 2020-11-25 PROCEDURE — 74011250636 HC RX REV CODE- 250/636: Performed by: ANESTHESIOLOGY

## 2020-11-25 PROCEDURE — 85027 COMPLETE CBC AUTOMATED: CPT

## 2020-11-25 RX ORDER — PROPOFOL 10 MG/ML
INJECTION, EMULSION INTRAVENOUS AS NEEDED
Status: DISCONTINUED | OUTPATIENT
Start: 2020-11-25 | End: 2020-11-25 | Stop reason: HOSPADM

## 2020-11-25 RX ORDER — SODIUM CHLORIDE 9 MG/ML
INJECTION, SOLUTION INTRAVENOUS
Status: DISCONTINUED | OUTPATIENT
Start: 2020-11-25 | End: 2020-11-25 | Stop reason: HOSPADM

## 2020-11-25 RX ORDER — LIDOCAINE HYDROCHLORIDE 20 MG/ML
INJECTION, SOLUTION EPIDURAL; INFILTRATION; INTRACAUDAL; PERINEURAL AS NEEDED
Status: DISCONTINUED | OUTPATIENT
Start: 2020-11-25 | End: 2020-11-25 | Stop reason: HOSPADM

## 2020-11-25 RX ORDER — HEPARIN SODIUM 1000 [USP'U]/ML
INJECTION, SOLUTION INTRAVENOUS; SUBCUTANEOUS AS NEEDED
Status: DISCONTINUED | OUTPATIENT
Start: 2020-11-25 | End: 2020-11-25 | Stop reason: HOSPADM

## 2020-11-25 RX ORDER — SODIUM CHLORIDE 0.9 % (FLUSH) 0.9 %
5-40 SYRINGE (ML) INJECTION AS NEEDED
Status: DISCONTINUED | OUTPATIENT
Start: 2020-11-25 | End: 2020-11-25 | Stop reason: HOSPADM

## 2020-11-25 RX ORDER — FENTANYL CITRATE 50 UG/ML
INJECTION, SOLUTION INTRAMUSCULAR; INTRAVENOUS AS NEEDED
Status: DISCONTINUED | OUTPATIENT
Start: 2020-11-25 | End: 2020-11-25 | Stop reason: HOSPADM

## 2020-11-25 RX ORDER — LIDOCAINE HYDROCHLORIDE 10 MG/ML
INJECTION, SOLUTION EPIDURAL; INFILTRATION; INTRACAUDAL; PERINEURAL AS NEEDED
Status: DISCONTINUED | OUTPATIENT
Start: 2020-11-25 | End: 2020-11-25 | Stop reason: HOSPADM

## 2020-11-25 RX ORDER — SODIUM CHLORIDE 0.9 % (FLUSH) 0.9 %
5-40 SYRINGE (ML) INJECTION EVERY 8 HOURS
Status: DISCONTINUED | OUTPATIENT
Start: 2020-11-25 | End: 2020-11-25 | Stop reason: HOSPADM

## 2020-11-25 RX ORDER — IODIXANOL 320 MG/ML
INJECTION, SOLUTION INTRAVASCULAR AS NEEDED
Status: DISCONTINUED | OUTPATIENT
Start: 2020-11-25 | End: 2020-11-25 | Stop reason: HOSPADM

## 2020-11-25 RX ORDER — CLOPIDOGREL BISULFATE 75 MG/1
75 TABLET ORAL DAILY
Qty: 30 TAB | Refills: 3 | Status: SHIPPED | OUTPATIENT
Start: 2020-11-25

## 2020-11-25 RX ADMIN — PROPOFOL 10 MG: 10 INJECTION, EMULSION INTRAVENOUS at 11:02

## 2020-11-25 RX ADMIN — FENTANYL CITRATE 25 MCG: 50 INJECTION, SOLUTION INTRAMUSCULAR; INTRAVENOUS at 10:38

## 2020-11-25 RX ADMIN — FENTANYL CITRATE 25 MCG: 50 INJECTION, SOLUTION INTRAMUSCULAR; INTRAVENOUS at 11:02

## 2020-11-25 RX ADMIN — FENTANYL CITRATE 25 MCG: 50 INJECTION, SOLUTION INTRAMUSCULAR; INTRAVENOUS at 11:33

## 2020-11-25 RX ADMIN — PROPOFOL 10 MG: 10 INJECTION, EMULSION INTRAVENOUS at 10:49

## 2020-11-25 RX ADMIN — PROPOFOL 20 MG: 10 INJECTION, EMULSION INTRAVENOUS at 11:21

## 2020-11-25 RX ADMIN — PROPOFOL 20 MG: 10 INJECTION, EMULSION INTRAVENOUS at 10:38

## 2020-11-25 RX ADMIN — LIDOCAINE HYDROCHLORIDE 10 MG: 20 INJECTION, SOLUTION EPIDURAL; INFILTRATION; INTRACAUDAL; PERINEURAL at 10:35

## 2020-11-25 RX ADMIN — SODIUM CHLORIDE: 900 INJECTION, SOLUTION INTRAVENOUS at 10:27

## 2020-11-25 RX ADMIN — PROPOFOL 20 MG: 10 INJECTION, EMULSION INTRAVENOUS at 10:35

## 2020-11-25 RX ADMIN — HEPARIN SODIUM 7000 UNITS: 1000 INJECTION, SOLUTION INTRAVENOUS; SUBCUTANEOUS at 11:02

## 2020-11-25 NOTE — ROUTINE PROCESS
TRANSFER - IN REPORT: 
 
Verbal report received from [facility], [address] [place] [address] lab(name) on Roselyn Salinas  being received from Cath Lab(unit) for routine progression of care Report consisted of patients Situation, Background, Assessment and  
Recommendations(SBAR). Information from the following report(s) SBAR was reviewed with the receiving nurse. Opportunity for questions and clarification was provided. Assessment completed upon patients arrival to unit and care assumed. Awake on command, good respiratory effort. No complaints. Right groin dressing intact with Angio seal to site. Procedure completed without incident

## 2020-11-25 NOTE — Clinical Note
Peripheral Lesion 1, atherectomy performed: directional.   Pass RPM = 120. Duration = 17 sec. Pass #: 3.

## 2020-11-25 NOTE — Clinical Note
Peripheral Lesion 1, atherectomy performed: directional.   Pass RPM = 90. Duration = 13 sec. Pass #: 2.

## 2020-11-25 NOTE — ANESTHESIA POSTPROCEDURE EVALUATION
Procedure(s):  ANGIOGRAPHY LOWER EXT LEFT/poss intervention  Atherectomy Peripheral Artery. MAC    Anesthesia Post Evaluation      Multimodal analgesia: multimodal analgesia used between 6 hours prior to anesthesia start to PACU discharge  Patient location during evaluation: PACU  Patient participation: complete - patient participated  Level of consciousness: awake and alert  Pain management: adequate  Airway patency: patent  Anesthetic complications: no  Cardiovascular status: acceptable  Respiratory status: acceptable  Hydration status: acceptable  Post anesthesia nausea and vomiting:  controlled  Final Post Anesthesia Temperature Assessment:  Normothermia (36.0-37.5 degrees C)      INITIAL Post-op Vital signs: No vitals data found for the desired time range.

## 2020-11-25 NOTE — ANESTHESIA PREPROCEDURE EVALUATION
Relevant Problems   No relevant active problems       Anesthetic History   No history of anesthetic complications            Review of Systems / Medical History  Patient summary reviewed and pertinent labs reviewed    Pulmonary  Within defined limits                 Neuro/Psych         TIA     Cardiovascular  Within defined limits                     GI/Hepatic/Renal  Within defined limits              Endo/Other    Diabetes         Other Findings              Physical Exam    Airway  Mallampati: II  TM Distance: 4 - 6 cm  Neck ROM: normal range of motion   Mouth opening: Normal     Cardiovascular  Regular rate and rhythm,  S1 and S2 normal,  no murmur, click, rub, or gallop             Dental    Dentition: Upper partial plate     Pulmonary  Breath sounds clear to auscultation               Abdominal  GI exam deferred       Other Findings            Anesthetic Plan    ASA: 3  Anesthesia type: MAC          Induction: Intravenous  Anesthetic plan and risks discussed with: Patient

## 2020-11-25 NOTE — Clinical Note
Vessel: left renaliliac, CFA and PFA. Power injection to the artery. Single view taken. PSI = 1000. Rate of rise = 0.5 sec. Injection rate = 10 mL/sec. Total injected volume = 20 mL.

## 2020-11-25 NOTE — Clinical Note
Vessel: left CFA, PFA, SFA, popliteal, PTA, peroneal, DARLENE and dorsalis pedis. Power injection to the artery. Single view taken. PSI = 600. Rate of rise = 0.5 sec. Injection rate = 4 mL/sec. Total injected volume = 40 mL.

## 2020-11-25 NOTE — Clinical Note
TRANSFER - OUT REPORT:     Verbal report given to: Annmarie Aggarwal RN. Report consisted of patient's Situation, Background, Assessment and   Recommendations(SBAR). Opportunity for questions and clarification was provided. Patient transported with a Cardiac Cath Tech / Patient Care Tech. Patient transported to: [address]

## 2020-11-25 NOTE — Clinical Note
Peripheral Lesion 1. Balloon inflated using single inflation technique. Lesion #1: Pressure = 10 cony; Duration = 180 sec.

## 2020-11-25 NOTE — Clinical Note
Bilateral groin prepped with ChloraPrep and draped. Wet prep solution applied at: 1035. Wet prep solution dried at: 1038. Wet prep elapsed drying time: 3 mins.

## 2020-11-25 NOTE — DISCHARGE INSTRUCTIONS
Patient Education     DISCHARGE SUMMARY from Nurse:    Please hold your METFORMIN for 48 hours after your angioplasty procedure. Resume all other home medication as prescribed. PATIENT INSTRUCTIONS:    After general anesthesia or intravenous sedation, for 24 hours or while taking prescription Narcotics:  · Limit your activities  · Do not drive and operate hazardous machinery  · Do not make important personal or business decisions  · Do  not drink alcoholic beverages  · If you have not urinated within 8 hours after discharge, please contact your surgeon on call. Report the following to your surgeon:  · Excessive pain, swelling, redness or odor of or around the surgical area  · Temperature over 100.5  · Nausea and vomiting lasting longer than 4 hours or if unable to take medications  · Any signs of decreased circulation or nerve impairment to extremity: change in color, persistent  numbness, tingling, coldness or increase pain  · Any questions    What to do at Home:  Recommended activity: No lifting, Driving, or Strenuous exercise for 24 hours. If you experience any of the following symptoms bleeding swelling, acute pain or numbness, fever, please follow up with Dr. Tayla Ocasio, DO @ [phone]. *  Please give a list of your current medications to your Primary Care Provider. *  Please update this list whenever your medications are discontinued, doses are      changed, or new medications (including over-the-counter products) are added. *  Please carry medication information at all times in case of emergency situations. These are general instructions for a healthy lifestyle:    No smoking/ No tobacco products/ Avoid exposure to second hand smoke  Surgeon General's Warning:  Quitting smoking now greatly reduces serious risk to your health.     Obesity, smoking, and sedentary lifestyle greatly increases your risk for illness    A healthy diet, regular physical exercise & weight monitoring are important for maintaining a healthy lifestyle    You may be retaining fluid if you have a history of heart failure or if you experience any of the following symptoms:  Weight gain of 3 pounds or more overnight or 5 pounds in a week, increased swelling in our hands or feet or shortness of breath while lying flat in bed. Please call your doctor as soon as you notice any of these symptoms; do not wait until your next office visit. The discharge information has been reviewed with the patient. The patient verbalized understanding. Discharge medications reviewed with the patient and appropriate educational materials and side effects teaching were provided. ___________________________________________________________________________________________________________________________________     Peripheral Artery Angioplasty: What to Expect at [facility]  Peripheral artery angioplasty (say \"frr-WETK-lx-luis fernandoll AR-ter-rigo KENDALLEKR-cil-ve-[facility]-nadya\") is a procedure that widens narrowed arteries in the pelvis or legs. Your doctor used a tube called a catheter to find narrowed arteries in your pelvis or legs and then widened them. Your groin or leg may have a bruise or a small lump where the catheter was put in your groin. The area may feel sore for a day or two after the procedure. You can do light activities around the house but nothing strenuous for several days. After surgery, blood may flow better throughout your leg. This can decrease leg pain, numbness, and cramping. This care sheet gives you a general idea about how long it will take for you to recover. But each person recovers at a different pace. Follow the steps below to feel better as quickly as possible. How can you care for yourself at home? Activity    · Do not do strenuous exercise and do not lift anything heavy until your doctor says it is okay. This may be for a day or two.  You can walk around the house and do light activity, such as cooking.     · Go back to regular exercise when your doctor says it is okay. Walking is a good choice.     · If you work, you will probably need to take 1 or 2 days off. It depends on the type of work you do and how you feel. Diet    · You can eat your normal diet.     · Drink plenty of fluids (unless your doctor tells you not to). Medicines    · Your doctor will tell you if and when you can restart your medicines. He or she will also give you instructions about taking any new medicines.     · If you take aspirin or some other blood thinner, ask your doctor if and when to start taking it again. Make sure that you understand exactly what your doctor wants you to do.     · Be safe with medicines. Take your medicines exactly as prescribed. Call your doctor if you think you are having a problem with your medicine.     · Your doctor may prescribe a blood thinner when you go home. This helps prevent blood clots. Be sure you get instructions about how to take your medicine safely. Blood thinners can cause serious bleeding problems. Care of the catheter site    · Keep a bandage over the spot where the catheter was inserted for the first day, or for as long as your doctor recommends.     · Put ice or a cold pack on the area for 10 to 20 minutes at a time to help with soreness or swelling. Do this every few hours. Put a thin cloth between the ice and your skin.     · You may shower 24 to 48 hours after the procedure, if your doctor okays it. Pat the incision dry.     · Do not soak the catheter site until it is healed. Don't take a bath for 1 week, or until your doctor tells you it is okay.     · Watch for bleeding from the site. A small amount of blood (up to the size of a quarter) on the bandage can be normal.     · If you are bleeding, lie down and press on the area for 15 minutes to try to make it stop. If the bleeding does not stop, call your doctor or seek immediate medical care.    Follow-up care is a key part of your treatment and safety. Be sure to make and go to all appointments, and call your doctor if you are having problems. It's also a good idea to know your test results and keep a list of the medicines you take. When should you call for help? Call 911 anytime you think you may need emergency care. For example, call if:    · You passed out (lost consciousness).     · You have severe trouble breathing.     · You have sudden chest pain and shortness of breath, or you cough up blood.     · Your groin is very swollen and you have a lump that is getting bigger under your skin where the catheter was put in. Call your doctor now or seek immediate medical care if:    · You have severe pain in your leg, or it becomes cold, pale, blue, tingly, or numb.     · You are bleeding from the area where the catheter was put in your artery.     · You have a fast-growing, painful lump at the catheter site.     · You have signs of infection, such as:  ? Increased pain, swelling, warmth, or redness of the skin. ? Red streaks leading from the area. ? Pus draining from the area. ? A fever. Watch closely for changes in your health, and be sure to contact your doctor if you have any problems. Where can you learn more? Go to http://www.gray.com/  Enter B505 in the search box to learn more about \"Peripheral Artery Angioplasty: What to Expect at Home. \"  Current as of: December 16, 2019               Content Version: 12.6  © 7076-2946 Healthwise, Incorporated. Care instructions adapted under license by [employer] (which disclaims liability or warranty for this information). If you have questions about a medical condition or this instruction, always ask your healthcare professional. [place] [address] any warranty or liability for your use of this information. Patient armband removed and shredded  MyChart Activation    Thank you for requesting access to [employer].  Please follow the instructions below to securely access and download your online medical record. [employer] allows you to send messages to your doctor, view your test results, renew your prescriptions, schedule appointments, and more. How Do I Sign Up? 1. In your internet browser, go to [email or web address]. 2. Click on the First Time User? Click Here link in the Sign In box. You will see the New Member Sign Up page. 3. Enter your [employer] Access Code exactly as it appears below. You will not need to use this code after youve completed the sign-up process. If you do not sign up before the expiration date, you must request a new code. [employer] Access Code: [email or web address]  Expires: 2020 10:33 AM (This is the date your [employer] access code will )    4. Enter the last four digits of your Social Security Number (xxxx) and Date of Birth (mm/dd/yyyy) as indicated and click Submit. You will be taken to the next sign-up page. 5. Create a [employer] ID. This will be your [employer] login ID and cannot be changed, so think of one that is secure and easy to remember. 6. Create a [employer] password. You can change your password at any time. 7. Enter your Password Reset Question and Answer. This can be used at a later time if you forget your password. 8. Enter your e-mail address. You will receive e-mail notification when new information is available in [address] 9. Click Sign Up. You can now view and download portions of your medical record. 10. Click the Download Summary menu link to download a portable copy of your medical information. Additional Information    If you have questions, please visit the Frequently Asked Questions section of the [employer] website at [email or web address] Remember, [employer] is NOT to be used for urgent needs. For medical emergencies, dial 911.

## 2020-11-25 NOTE — OP NOTES
Preoperative diagnosis: PAD, tibial occlusive disease, ulceration left heel    Postoperative diagnosis: Same    Procedures performed:  #1  Ultrasound of right femoral artery with interpretation  #2  Placement catheter to aorta, aortogram with interpretation. Placement catheter to left common femoral artery, left leg angiogram with interpretation. Placement of catheter to left posterior tibial artery, posterior tibial angiogram interpretation. Right femoral angiogram with interpretation  #3  Atherectomy of left posterior tibial artery  #4  Balloon angioplasty of left posterior tibial artery with angiogram interpretation    Cultures: None    Specimens: None    Drains: None    Estimated blood loss: Less than 50 mL    Assistants: None    Implants: Please see above    Complications: None    Anesthesia: Moderate conscious sedation. Indications for the procedure:  Maura Anne is a [de-identified] y.o. chronic ulcer left heel. Patient was given the appropriate risk and benefits of the procedure including but not limited to bleeding, infection, damage to adjacent structures, MI, stroke, death, loss of lower extremity, need for further surgery. Patient was understanding of all the risks and underwent a procedure. Operative findings:   #1  Aorta: Aorta is patent and tortuous with mild dilation distally. The bilateral common iliac internal and external iliac arteries are patent. Left leg: Common femoral and profunda are patent. The SFA and popliteal are heavily atherosclerotic but fully patent. The anterior tibial occludes completely about 2 cm past its origin. The tibial peroneal trunk is patent the posterior tibial occludes about the mid calf. There is reconstitution later with collaterals. The peroneal is patent giving rise to collaterals to the foot. Procedure:  Patient was correctly identified in the precath area and taken to the Cath Lab in stable condition.   Patient had pre-incision timeout prior to any incision. Patient was prepped and draped in the normal sterile fashion according to CDC guidelines aseptic technique. Ultrasound was done of the right femoral artery start a picture for PACS. Identified the mid common femoral did a single puncture and placed a wire into the aorta placed a 4 Ukrainian sheath. Exchange the wire for a flush catheter to the aortogram.  Pulled the catheter down to the distal aorta rewired placed over the left common femoral to the left leg angiogram.  Placed angled glide stiff into the SFA brought a 6 Ukrainian sheath up and over into the SFA. Anticoagulated with 7000 of heparin. Brought a wire and a quick cross catheter all the way down to the ankle crossing the occluded segments of posterior tibial.  There is CSI atherectomy at 3 rotating speeds through the occluded segment. Then did a 2.5 x 220 balloon to the entire length of the posterior tibial.  Final angiogram shows a fully patent posterior tibial but is only communicates the collaterals not directly to the pedal arch. I think this is as much progress is working to be able to get a remove the wire and balloons and placed a Bentson wire back pulled the sheath back had done femoral angiogram and then deployed an Angio-Seal without complication. She was transferred to recovery in stable condition.

## 2020-11-25 NOTE — ROUTINE PROCESS
A+Ox4, able to stand and pivot onto wheelchair with assistance as when received. Right groin dressing intact, no bleeding or swelling. Discharge information reviewed with the patient and her daughter. Denied any complaints. Escorted to car, tot her daughter for transport. AVS Discharge instructions reviewed with patient and copy given to patient. All questions answered. Patient verbalized understanding to all discharge instructions. PIV removed. Procedural site within normal limits. No hematoma or bleeding noted from procedural and PIV site. No pain noted at discharge. Patient discharged with support person in stable condition. Escorted out to vehicle for transport home.

## 2020-11-30 ENCOUNTER — TELEPHONE (OUTPATIENT)
Dept: VASCULAR SURGERY | Age: 80
End: 2020-11-30

## 2020-11-30 NOTE — TELEPHONE ENCOUNTER
Returned phone call to patient's daughter who states that since the procedure patient has been very fatigued and even had a low grade fever that fluctuated between 99.5 to 100.4 this past Saturday and daughter states she broke the fever on Sunday and she had just given her plenty of fluids. She states her mom is just so weak. Patient's groin is not swollen or red from insertion site for procedure. Patient's daughter will take patient to PCP for evaluation to make sure she has not developed some type of virus. Her daughter will call back with an update.

## 2020-11-30 NOTE — TELEPHONE ENCOUNTER
Patients daughter called and stated since her mothers surgery, she developed a fever of 104 and she has severe fatigue. The temperature has come down, but the daughter would like to speak with someone just to make sure this is normal or not. Please call Ms. Rosadoana @ [phone]

## 2020-12-03 ENCOUNTER — OFFICE VISIT (OUTPATIENT)
Dept: VASCULAR SURGERY | Age: 80
End: 2020-12-03
Payer: MEDICARE

## 2020-12-03 VITALS
SYSTOLIC BLOOD PRESSURE: 140 MMHG | DIASTOLIC BLOOD PRESSURE: 70 MMHG | RESPIRATION RATE: 20 BRPM | OXYGEN SATURATION: 100 % | HEART RATE: 83 BPM

## 2020-12-03 DIAGNOSIS — I73.9 PAD (PERIPHERAL ARTERY DISEASE) (HCC): Primary | ICD-10-CM

## 2020-12-03 PROCEDURE — G8417 CALC BMI ABV UP PARAM F/U: HCPCS | Performed by: PHYSICIAN ASSISTANT

## 2020-12-03 PROCEDURE — G8400 PT W/DXA NO RESULTS DOC: HCPCS | Performed by: PHYSICIAN ASSISTANT

## 2020-12-03 PROCEDURE — G8536 NO DOC ELDER MAL SCRN: HCPCS | Performed by: PHYSICIAN ASSISTANT

## 2020-12-03 PROCEDURE — G8432 DEP SCR NOT DOC, RNG: HCPCS | Performed by: PHYSICIAN ASSISTANT

## 2020-12-03 PROCEDURE — 1090F PRES/ABSN URINE INCON ASSESS: CPT | Performed by: PHYSICIAN ASSISTANT

## 2020-12-03 PROCEDURE — 1101F PT FALLS ASSESS-DOCD LE1/YR: CPT | Performed by: PHYSICIAN ASSISTANT

## 2020-12-03 PROCEDURE — G8428 CUR MEDS NOT DOCUMENT: HCPCS | Performed by: PHYSICIAN ASSISTANT

## 2020-12-03 PROCEDURE — 99212 OFFICE O/P EST SF 10 MIN: CPT | Performed by: PHYSICIAN ASSISTANT

## 2020-12-03 NOTE — PROGRESS NOTES
1. Have you been to the ER, urgent care clinic since your last visit? Hospitalized since your last visit? No    2. Have you seen or consulted any other health care providers outside of the [address] [place] [address] since your last visit? Include any pap smears or colon screening.  No         3 most recent PHQ Screens 12/3/2020   Little interest or pleasure in doing things Not at all   Feeling down, depressed, irritable, or hopeless Several days   Total Score PHQ 2 1

## 2020-12-03 NOTE — PROGRESS NOTES
Subjective:      Pako Steven is a [de-identified] y.o. female who presents today for post op visit, status post angio    She had come from podiatry with nonhealing chronic ulcer on the bottom of her left foot.  She is had multiple debridements and good offloading. She developed increased pain and had changes in her noninvasive studies prompting recommendation for angio    We had a call Monday from the daughter who states that since the procedure patient has been very fatigued and even had a low grade fever that fluctuated between 99.5 to 100.4 this past Saturday and daughter states she broke the fever on Sunday and she had just given her plenty of fluids. She states her mom is just so weak. Patient's groin is not swollen or red from insertion site for procedure. Patient's daughter will take patient to PCP for evaluation to make sure she has not developed some type of virus. Her daughter will call back with an update. We had not heard back but daughter tells me today she has visit tomorrow. She has had no further fevers. Still generally fatigued. Also concerned some increased swelling of left leg since procedure    Operative findings:   #1  Aorta: Aorta is patent and tortuous with mild dilation distally. The bilateral common iliac internal and external iliac arteries are patent.     Left leg: Common femoral and profunda are patent. The SFA and popliteal are heavily atherosclerotic but fully patent. The anterior tibial occludes completely about 2 cm past its origin. The tibial peroneal trunk is patent the posterior tibial occludes about the mid calf. There is reconstitution later with collaterals.   The peroneal is patent giving rise to collaterals to the foot    Intervention:  Atherectomy of left posterior tibial artery  Balloon angioplasty of left posterior tibial artery    Objective:     Visit Vitals  BP (!) 140/70 (BP 1 Location: Right arm, BP Patient Position: Sitting)   Pulse 83   Resp 20   SpO2 100%     She does have mild edema more significant of the left leg  She also has some underlying venous insufficiency that we know with appearance of some small varicosities. No phlebitis  She does have this plantar ulcer currently dry of the left foot  But I do hear good biphasic Doppler signals of the left DP and PT    Assessment:     Angio discharge visit. Plan:       ICD-10-CM ICD-9-CM    1. PAD (peripheral artery disease) ([facility])  I73.9 443.9 DUPLEX LOWER EXT ART LEFT W ERWIN     No orders of the defined types were placed in this encounter. I did explain probably some reperfusion edema on top of the fact that she historically has had swelling on and off. I have encouraged elevation, range of motion exercises, it was placed in a light Tubigrip today. Hopefully this will improve over the next few weeks  She will follow-up with podiatry status post intervention for reassessment of ulcer  She is doing the Plavix and aspirin and she will have her follow-up with ultrasound in about 3 months at which point we can evaluate being able to discontinue the Plavix at that time    As for the low-grade fever not very typical they experience that after this procedure. Certainly being fatigued, may be she is dehydrated just from her n.p.o. status and then her contrast exposure. But it sound like she has been making some improvements. She will see her PCP tomorrow though  Any questions or concerns to give us a call  I did explain that after the first of the year Dr. Donnell Johnson will be at a new location and they were given that information if they choose to follow-up and make arrangements for follow-up there      YONI Benedict    Portions of this note have been entered using voice recognition software.

## 2021-03-03 DIAGNOSIS — I73.9 PAD (PERIPHERAL ARTERY DISEASE) (HCC): ICD-10-CM

## 2022-01-21 ENCOUNTER — DOCUMENTATION ONLY (OUTPATIENT)
Dept: VASCULAR SURGERY | Age: 82
End: 2022-01-21

## 2022-01-26 NOTE — Clinical Note
TRANSFER - IN REPORT:     Verbal report received from: Collin Davis RN. Report consisted of patient's Situation, Background, Assessment and   Recommendations(SBAR). Opportunity for questions and clarification was provided. Assessment completed upon patient's arrival to unit and care assumed. Patient transported with a Cardiac Cath Tech / Patient Care Tech. [FreeTextEntry1] : January 26, 2022\par Patient returns for follow up\par Feeling increase in pain recently\par right shoulder and back\par Feels stiffness in the hands in the morning as well\par Took prednisone x 2 doses this week with decrease in pain from 10/10 to 4/10\par will have injections for the right shoulder in March 2022\par Discussed vectra DA as well\par Blood tests with PMD 1/14/2022 vitamin d 8, started on vitamin D 76132 per week\par GI symptoms much improved\par \par October 20, 2021\par Had blood tests completed, annual exam last week with PMD\par Had flu vaccine with PMD\par Scheduled for October 25 for Covid booster\par Had right shoulder early September 2021 at [facility]\par No PT at present following surgery as felt the PT made the pan worse, continues home exercises as tolerated\par No changes in medications since seeing medical doctor except added vitamin d \par Plaquenil 200 mg bid\par Sees pain management, had an epidural which helped a little bit, doesn't last too long\par Walking for exercise, about 20 blocks per day, on good, otherwise about 8-10\par Mammogram scheduled\par \par July 21, 2021\par Had shoulder injection on the right, limited benefit\par Feels pain with ROM and with sleeping on the right side, feels worse at night\par Now taking short course of prednisone with unclear benefit\par Was seen seen in [address] [place] [address] by orthopedist on July 16th for second opinion\par Will start PT and if not improved, will consider surgery\par Had lap band replaced about two weeks ago via laparoscopic procedure at [facility]\par Feeling much better in terms of GI symptoms, able to tolerate food at this time\par \par May 20, 2021\par Pain in the right shoulder, for the past four months\par Feels hard to sleep\par Painful, seeing pain management, lowered dosage of pain medications which feels may also have made the pain worse.\par Feels pain in the spine and the shoulder \par Requesting MRI for further evaluation of the right shoulder.\par Had colonoscopy, three polyps, follow up in 6 months to 1 year\par \par Following closely with GI, worsening GI symptoms lately, 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 \par \par February 23, 2021\par Seen by new pain management doctor\par Has MRI scheduled for the cervical and lumbar spine\par Had reaction to steroid with last epidural, developed high blood pressure and headaches\par Mostly the neck and left upper extremity\par Patient is not interested in surgery at this time\par Joint pain controlled at this time\par \par December 15, 2020\par Patient returns for follow up. \par Patient feeling well, no joint pains today.  Major concern related to chronic back pain, follows with pain management\par Patient had surgery for hernia repair and tolerated well.\par Patient planning to relocate to [place] as well\par No recent changes in medications\par \par September 9, 2020\par Discussed with patient.  You have chosen to receive care through the use of tele-media.  Telemedia enables health care providers at different locations to provide safe, effective, and convenient care through the use of technology.  Please note this is a billable encounter.  Patient understands that I cannot perform a physical examine and that patient may need to come to the clinic to complete the assessment.  Patient agreed verbally to to understanding the risks of benefits of telemedia as explained.\par \par 61 year old woman with history of RA, seen as follow Up reestablish care.  Patient was previously seen more than six months ago.  Since that time, has been following with pain management.  She continue Plaquenil and sulfasalazine but not regularly.  Had some difficulty with refill of Plaquenil due to use for coronavirus. \par \par Pain in the hands, shoulder pain\par Had accident about 1-2 months,  when something fell on her shoulder. was seen in urgent care, had xray, no fracture noted.  Still with pain in the right shoulder, feels pain with abduction of the right shoulder.  Sometimes hard to sleep, sometimes feels some paresthesias in the hands.  \par Has follow up with pain management Tuesday, will discuss the shoulder pain with doctor.  \par Started taking prednisone every other day which has helped\par Feels stiffness and pain in the hands and shoulders\par Patient waiting for approval for spinal stimulator for pain control. \par \par In past, patient was treated with rituxan and decadron (reaction to iv solumedrol), last dose more than one year ago.\par \par September 9, 2020\par Patient returns for follow up\par Patient feeling well n terms of joint pain and stiffness, however main concern related to low back pain.\par Patient was staying with her daughter's family in [place], was lifting her granddaughter and felt pain in the abdominal and back.  Was seen in emergency room, and needs hernia repair surgery, scheduled next week at [facility].  Had all imaging completed in [place], [facility].  She will also have pre op clearance next week with her medical doctor.  Labs competed in [place], not available to me at this time. \par Continues Plaquenil and sulfasalazine, no side effects noted. \par Patient requesting refill for Chantix until able to see PMD next week.\par Patient may be relocating to [place] and requests records to be sent to rheumatology practice there.

## 2022-07-08 NOTE — PROGRESS NOTES
Cindy Padilla    Chief Complaint   Patient presents with    Leg Pain       History and Physical    51-year-old female following up regarding her venous ultrasound. She had bilateral leg swelling but left side is worse. No shortness of breath no chest pain. She has a small healing ulcer on the bottom of her heel    Past Medical History:   Diagnosis Date    Diabetes ([facility] [address]     Fatigue     Rectal bleeding 9/13    TIA (transient ischemic attack)      Patient Active Problem List   Diagnosis Code    Varicose veins of lower extremities with other complications L47.264    PAD (peripheral artery disease) ([facility] [address] I73.9    Bilateral leg edema R60.0     Past Surgical History:   Procedure Laterality Date    HX COLECTOMY  9/2013    with ileostomy    HX COLONOSCOPY  9/2013    HX HYSTERECTOMY  1980    HX OTHER SURGICAL      left big toe ingrown nail removed, toe next to big toe on left foot is numb without circulation     Current Outpatient Medications   Medication Sig Dispense Refill    cholecalciferol (VITAMIN D3) 1,000 unit cap Take  by mouth two (2) times a day.  metFORMIN (GLUCOPHAGE) 500 mg tablet Take  by mouth two (2) times daily (with meals).  cholestyramine-sucrose (QUESTRAN) 4 gram powder Take  by mouth three (3) times daily (with meals).  Bifidobacterium Infantis (ALIGN) 4 mg cap Take 1 Cap by Mouth Once a Day.  mometasone (NASONEX) 50 mcg/actuation nasal spray 1 [place] by Both Nostrils route daily.  ferrous sulfate 325 mg (65 mg iron) tablet two (2) times a day. Take 1 Cap by Mouth.  aspirin delayed-release 81 mg tablet Take 1 Tab by Mouth Once a Day.  loperamide (IMODIUM A-D) 2 mg tablet Take 2 mg by Mouth Every  Morning Before Breakfast.      sitaGLIPtin (JANUVIA) 100 mg tablet Take 1 Tab by Mouth Once a Day.        Allergies   Allergen Reactions    Apple Juice Other (comments)    Banana Unknown (comments)    Gemfibrozil Unknown (comments)    Oxycodone-Acetaminophen Much improved  Continue to elevate your legs when sitting down  May discontinue Lasix  Use warm compresses to the hematoma on the right leg  Other (comments)     Causes hallucinations    Shellfish Derived Unknown (comments)     denies    Statins-Hmg-Coa Reductase Inhibitors Unknown (comments)       Review of Systems    A full review of systems was completed times ten organ systems and was deemed negative unless otherwise mentioned in the HPI. Physical   Visit Vitals  /70 (BP 1 Location: Left arm, BP Patient Position: Sitting)   Resp 17   Ht 5' 6\" (1.676 m)   Wt 189 lb (85.7 kg)   BMI 30.51 kg/m²       She looks well today is her birthday she is dressed up and go out with family for lunch. Head is normocephalic  Neck no JVD  Chest is clear  Abdomen soft flat nontender  Lower extremities she does have swelling notable left side worse than right at the lower part of the extremity. Feet are warm. Doppler shows no DVT bilateral.  She does have bilateral saphenofemoral reflux significant in nature I think more so on the left. This is demonstrated by more dilated vein and significant reflux. Impression/Plan:     ICD-10-CM ICD-9-CM    1. Bilateral lower extremity edema R60.0 782.3      Instructing her to get back into her compression stockings she still has repair at home. We will see her back after some time wearing these to see the response to therapy. We talked about ablation procedures that I think would be helpful for her. But again she is worn her stockings intermittently now for some time but where she can now start wearing them strict daily to see her response to therapy. We will see her back in 6 weeks  No orders of the defined types were placed in this encounter. Laureano Casas MD    PLEASE NOTE:  This document has been produced using voice recognition software. Unrecognized errors in transcription may be present.

## 2022-11-02 ENCOUNTER — TRANSCRIBE ORDER (OUTPATIENT)
Dept: SCHEDULING | Age: 82
End: 2022-11-02

## 2022-11-02 DIAGNOSIS — F02.B18 DEMENTIA IN OTHER DISEASES CLASSIFIED ELSEWHERE, MODERATE, WITH OTHER BEHAVIORAL DISTURBANCE: Primary | ICD-10-CM

## 2022-12-01 ENCOUNTER — HOSPITAL ENCOUNTER (OUTPATIENT)
Age: 82
End: 2022-12-01
Attending: NURSE PRACTITIONER
Payer: MEDICARE

## 2022-12-01 DIAGNOSIS — F02.B18 DEMENTIA IN OTHER DISEASES CLASSIFIED ELSEWHERE, MODERATE, WITH OTHER BEHAVIORAL DISTURBANCE: ICD-10-CM

## 2022-12-01 PROCEDURE — 70551 MRI BRAIN STEM W/O DYE: CPT

## 2023-05-19 RX ORDER — MOMETASONE FUROATE 50 UG/1
1 SPRAY, METERED NASAL DAILY
COMMUNITY

## 2023-05-19 RX ORDER — CHOLESTYRAMINE 4 G/9G
POWDER, FOR SUSPENSION ORAL
COMMUNITY

## 2023-05-19 RX ORDER — DIPHENHYDRAMINE HCL 25 MG
CAPSULE ORAL
COMMUNITY
Start: 2020-11-02

## 2023-05-19 RX ORDER — CLOPIDOGREL BISULFATE 75 MG/1
75 TABLET ORAL DAILY
COMMUNITY
Start: 2020-11-25

## 2023-05-19 RX ORDER — PREDNISONE 50 MG/1
TABLET ORAL
COMMUNITY
Start: 2020-11-02

## 2023-05-19 RX ORDER — HYDROCHLOROTHIAZIDE 12.5 MG/1
12.5 TABLET ORAL DAILY
COMMUNITY

## 2023-05-19 RX ORDER — FERROUS SULFATE 325(65) MG
TABLET ORAL 2 TIMES DAILY
COMMUNITY

## 2023-05-19 RX ORDER — ASPIRIN 81 MG/1
1 TABLET ORAL DAILY
COMMUNITY
Start: 2014-01-14

## 2023-05-19 RX ORDER — LOPERAMIDE HYDROCHLORIDE 2 MG/1
TABLET ORAL
COMMUNITY
Start: 2013-11-06

## 2023-05-19 RX ORDER — ALUMINUM ZIRCONIUM OCTACHLOROHYDREX GLY 16 G/100G
GEL TOPICAL
COMMUNITY

## 2023-05-19 RX ORDER — PANTOPRAZOLE SODIUM 40 MG/1
TABLET, DELAYED RELEASE ORAL
COMMUNITY
Start: 2020-11-02

## (undated) DEVICE — SYR ART 700 CLEAR MARK 7 -- ARTERION

## (undated) DEVICE — CATH ATHRCTMY SOLID-CRN 1.25MM -- DIAMONDBACK 360

## (undated) DEVICE — RADIFOCUS TORQUE DEVICE MULTI-TORQUE VISE: Brand: RADIFOCUS TORQUE DEVICE

## (undated) DEVICE — VIPERWIRE, ADVANCE PERIPHERAL, .017" DIA SPRING TIP, 335CM, 5 PACK: Brand: VIPERWIRE, ADVANCE

## (undated) DEVICE — ANGIOGRAPHY KIT CUST VASC

## (undated) DEVICE — LUB GUID WR CARDIC CATH 100ML -- VIPERSLIDE

## (undated) DEVICE — CATHETER ANGIO AD PED 4FR L65CM GWIRE 0.035IN PERIPH

## (undated) DEVICE — ANGIO-SEAL VIP VASCULAR CLOSURE DEVICE: Brand: ANGIO-SEAL

## (undated) DEVICE — INTRODUCER SHTH 4FR CANN L11CM DIL TIP 25MM RED TUNGSTEN

## (undated) DEVICE — SET FLD ADMIN 3 W STPCOCK FIX FEM L BOR 1IN

## (undated) DEVICE — Device: Brand: QUICK-CROSS SUPPORT CATHETER

## (undated) DEVICE — DESTINATION RENAL GUIDING SHEATH: Brand: DESTINATION

## (undated) DEVICE — DEVICE INFL L13IN 40ATM 30ML BASIXTOUCH

## (undated) DEVICE — PTA BALLOON DILATATION CATHETER: Brand: COYOTE™

## (undated) DEVICE — RADIFOCUS GLIDEWIRE: Brand: GLIDEWIRE

## (undated) DEVICE — COVER US PRB W15XL120CM W/ GEL RUBBERBAND TAPE STRP FLD GEN

## (undated) DEVICE — PACK PROCEDURE SURG VASC CATH 161 MMC LF